# Patient Record
Sex: FEMALE | Race: WHITE | NOT HISPANIC OR LATINO | Employment: UNEMPLOYED | URBAN - NONMETROPOLITAN AREA
[De-identification: names, ages, dates, MRNs, and addresses within clinical notes are randomized per-mention and may not be internally consistent; named-entity substitution may affect disease eponyms.]

---

## 2023-07-07 PROBLEM — L98.8 LESION OF SKIN OF BREAST: Status: ACTIVE | Noted: 2023-07-07

## 2023-08-02 DIAGNOSIS — L98.8 LESION OF SKIN OF BREAST: Primary | ICD-10-CM

## 2023-08-07 ENCOUNTER — OFFICE VISIT (OUTPATIENT)
Dept: FAMILY MEDICINE CLINIC | Facility: CLINIC | Age: 28
End: 2023-08-07
Payer: MEDICAID

## 2023-08-07 VITALS
OXYGEN SATURATION: 97 % | DIASTOLIC BLOOD PRESSURE: 80 MMHG | HEART RATE: 86 BPM | HEIGHT: 65 IN | WEIGHT: 252.7 LBS | SYSTOLIC BLOOD PRESSURE: 110 MMHG | TEMPERATURE: 98.7 F | BODY MASS INDEX: 42.1 KG/M2

## 2023-08-07 DIAGNOSIS — N60.81 SEBACEOUS CYST OF SKIN OF RIGHT BREAST: ICD-10-CM

## 2023-08-07 DIAGNOSIS — E66.01 MORBID (SEVERE) OBESITY DUE TO EXCESS CALORIES: Primary | ICD-10-CM

## 2023-08-07 PROCEDURE — 1160F RVW MEDS BY RX/DR IN RCRD: CPT | Performed by: PHYSICIAN ASSISTANT

## 2023-08-07 PROCEDURE — 1159F MED LIST DOCD IN RCRD: CPT | Performed by: PHYSICIAN ASSISTANT

## 2023-08-07 PROCEDURE — 99214 OFFICE O/P EST MOD 30 MIN: CPT | Performed by: PHYSICIAN ASSISTANT

## 2023-08-07 NOTE — PROGRESS NOTES
Office Note     Name: Avtar Dennis    : 1995     MRN: [id number]     Chief Complaint  Weight Loss    Subjective     History of Present Illness:  Avtar Dennis is a 27 y.o. female who presents today for eval for weight loss.  She states that she has completely quit alcohol, and she has been making significant changes to her diet.  She has still been unable to lose weight, and she is interested in something to help.  She states that she works out 3-4 times a week as well as doing arm strengthening daily.  She has eliminated processed foods and is only eating whole foods almost exclusively.  She states that she rarely goes out to eat.  She states that she is also working on quitting smoking, but she knows that that will also add more weight.    She states that the lesion on her breast evaluated at last visit is about the same.  She states that it does get some small amount of discharge and it occasionally, but she has not had any infection or tenderness.  Her ultrasound showed a cyst.    Past Medical History: History reviewed. No pertinent past medical history.    Past Surgical History: History reviewed. No pertinent surgical history.    Family History:   Family History   Problem Relation Age of Onset    Melanoma Mother     Breast cancer Maternal Grandmother     Melanoma Maternal Grandfather     Kidney cancer Paternal Grandmother     Brain cancer Paternal Grandfather     Breast cancer Maternal Great-Grandmother        Social History:   Social History     Socioeconomic History    Marital status: Single   Tobacco Use    Smoking status: Every Day     Packs/day: 0.25     Years: 5.00     Pack years: 1.25     Types: Cigarettes     Passive exposure: Never    Smokeless tobacco: Never   Vaping Use    Vaping Use: Never used   Substance and Sexual Activity    Alcohol use: Yes     Alcohol/week: 5.0 standard drinks     Types: 5 Drinks containing 0.5 oz of alcohol per week    Drug use: Yes     Types: Marijuana    Sexual  "activity: Yes       Immunizations:   There is no immunization history on file for this patient.     Medications:     Current Outpatient Medications:     Liraglutide (SAXENDA) 18 MG/3ML injection pen, Inject 0.6mg under skin daily for week one, THEN 1.2mg daily for week two, THEN 1.8mg daily for week three, then 2.4mg daily for week four., Disp: 3 mL, Rfl: 0    Allergies:   Allergies   Allergen Reactions    Penicillins Hives       Objective     Vital Signs  /80 (BP Location: Right arm, Patient Position: Sitting, Cuff Size: Large Adult)   Pulse 86   Temp 98.7 øF (37.1 øC) (Temporal)   Ht 165.1 cm (65\")   Wt 115 kg (252 lb 11.2 oz)   SpO2 97%   BMI 42.05 kg/mý   Estimated body mass index is 42.05 kg/mý as calculated from the following:    Height as of this encounter: 165.1 cm (65\").    Weight as of this encounter: 115 kg (252 lb 11.2 oz).    Physical Exam  Vitals and nursing note reviewed.   Constitutional:       General: She is not in acute distress.     Appearance: Normal appearance. She is obese. She is not ill-appearing.   HENT:      Head: Normocephalic and atraumatic.   Cardiovascular:      Rate and Rhythm: Normal rate and regular rhythm.      Pulses: Normal pulses.      Heart sounds: Normal heart sounds.   Pulmonary:      Effort: Pulmonary effort is normal. No respiratory distress.      Breath sounds: Normal breath sounds.   Neurological:      General: No focal deficit present.      Mental Status: She is alert and oriented to person, place, and time.   Psychiatric:         Mood and Affect: Mood normal.         Behavior: Behavior normal.      Assessment and Plan     Assessment/Plan:  Diagnoses and all orders for this visit:    1. Morbidly Obese (Primary)  Assessment & Plan:  Patient's (Body mass index is 42.05 kg/mý.) indicates that they are morbidly/severely obese (BMI > 40 or > 35 with obesity - related health condition) with health conditions that include none . Weight is newly identified. BMI  is " above average; BMI management plan is completed. We discussed portion control, increasing exercise, and pharmacologic options including Saxenda .  We discussed the benefits and risks of the medication along with potential side effects.  She states that she would still like to try the medication to help her.    Orders:  -     Liraglutide (SAXENDA) 18 MG/3ML injection pen; Inject 0.6mg under skin daily for week one, THEN 1.2mg daily for week two, THEN 1.8mg daily for week three, then 2.4mg daily for week four.  Dispense: 3 mL; Refill: 0    2. Sebaceous cyst of skin of right breast  Assessment & Plan:  Ultrasound reviewed from [facility] [place], which just showed a benign cyst of the right breast.  I advised her that she does not have to do anything about it unless it is bothering her, but it will be prone to infections.  She states that she prefers just to monitor for now.  We can refer her to a surgeon for excision if needed.          Follow Up  Return in about 4 weeks (around 9/4/2023) for recheck.    Amrita Mejia PA-C  [facility] Internal Medicine [facility]

## 2023-08-08 NOTE — ASSESSMENT & PLAN NOTE
Patient's (Body mass index is 42.05 kg/mý.) indicates that they are morbidly/severely obese (BMI > 40 or > 35 with obesity - related health condition) with health conditions that include none . Weight is newly identified. BMI  is above average; BMI management plan is completed. We discussed portion control, increasing exercise, and pharmacologic options including Saxenda .  We discussed the benefits and risks of the medication along with potential side effects.  She states that she would still like to try the medication to help her.

## 2023-08-08 NOTE — ASSESSMENT & PLAN NOTE
Ultrasound reviewed from [facility] [place], which just showed a benign cyst of the right breast.  I advised her that she does not have to do anything about it unless it is bothering her, but it will be prone to infections.  She states that she prefers just to monitor for now.  We can refer her to a surgeon for excision if needed.

## 2023-09-05 ENCOUNTER — OFFICE VISIT (OUTPATIENT)
Dept: FAMILY MEDICINE CLINIC | Facility: CLINIC | Age: 28
End: 2023-09-05
Payer: MEDICAID

## 2023-09-05 VITALS
SYSTOLIC BLOOD PRESSURE: 120 MMHG | BODY MASS INDEX: 41.32 KG/M2 | OXYGEN SATURATION: 99 % | TEMPERATURE: 98.6 F | WEIGHT: 248 LBS | HEART RATE: 98 BPM | HEIGHT: 65 IN | DIASTOLIC BLOOD PRESSURE: 80 MMHG

## 2023-09-05 DIAGNOSIS — E66.01 MORBID (SEVERE) OBESITY DUE TO EXCESS CALORIES: Primary | ICD-10-CM

## 2023-09-05 NOTE — PROGRESS NOTES
Office Note     Name: Avtar Dennis    : 1995     MRN: [id number]     Chief Complaint  Weight Loss    Subjective     History of Present Illness:  Avtar Dennis is a 27 y.o. female who presents today for eval for weight loss.  She states that she has continued to try to watch her diet and increase activity, and she has lost 4 pounds since her last visit.  She has been unable to get the Saxenda, but she is not sure what the problem is with the pharmacy.    Past Medical History: History reviewed. No pertinent past medical history.    Past Surgical History: No past surgical history on file.    Family History:   Family History   Problem Relation Age of Onset    Melanoma Mother     Breast cancer Maternal Grandmother     Melanoma Maternal Grandfather     Kidney cancer Paternal Grandmother     Brain cancer Paternal Grandfather     Breast cancer Maternal Great-Grandmother        Social History:   Social History     Socioeconomic History    Marital status: Single   Tobacco Use    Smoking status: Every Day     Packs/day: 0.25     Years: 5.00     Pack years: 1.25     Types: Cigarettes     Passive exposure: Never    Smokeless tobacco: Never   Vaping Use    Vaping Use: Never used   Substance and Sexual Activity    Alcohol use: Yes     Alcohol/week: 5.0 standard drinks     Types: 5 Drinks containing 0.5 oz of alcohol per week    Drug use: Yes     Types: Marijuana    Sexual activity: Yes       Immunizations:   There is no immunization history on file for this patient.     Medications:     Current Outpatient Medications:     Liraglutide (SAXENDA) 18 MG/3ML injection pen, Inject 0.6mg under skin daily for week one, THEN 1.2mg daily for week two, THEN 1.8mg daily for week three, then 2.4mg daily for week four. (Patient not taking: Reported on 2023), Disp: 3 mL, Rfl: 0    Allergies:   Allergies   Allergen Reactions    Penicillins Hives       Objective     Vital Signs  /80 (BP Location: Right arm, Patient Position:  "Sitting, Cuff Size: Large Adult)   Pulse 98   Temp 98.6 °F (37 °C) (Temporal)   Ht 165.1 cm (65\")   Wt 112 kg (248 lb)   SpO2 99%   BMI 41.27 kg/m²   Estimated body mass index is 41.27 kg/m² as calculated from the following:    Height as of this encounter: 165.1 cm (65\").    Weight as of this encounter: 112 kg (248 lb).      Physical Exam  Vitals and nursing note reviewed.   Constitutional:       General: She is not in acute distress.     Appearance: Normal appearance. She is obese. She is not ill-appearing.   HENT:      Head: Normocephalic and atraumatic.   Cardiovascular:      Rate and Rhythm: Normal rate and regular rhythm.      Pulses: Normal pulses.      Heart sounds: Normal heart sounds.   Pulmonary:      Effort: Pulmonary effort is normal. No respiratory distress.      Breath sounds: Normal breath sounds.   Neurological:      General: No focal deficit present.      Mental Status: She is alert and oriented to person, place, and time.   Psychiatric:         Mood and Affect: Mood normal.         Behavior: Behavior normal.      Assessment and Plan     Assessment/Plan:  Diagnoses and all orders for this visit:    1. Morbidly Obese (Primary)  Assessment & Plan:  Patient's (Body mass index is 41.27 kg/m².) indicates that they are morbidly/severely obese (BMI > 40 or > 35 with obesity - related health condition) with health conditions that include none . Weight is improving with lifestyle modifications. BMI  is above average; BMI management plan is completed. We discussed portion control and increasing exercise.  I continue to recommend Saxenda for help with weight loss, but she has been unable to get it at the pharmacy.  We contacted the pharmacy while she was here, but we were unable to get anyone on the phone after 15 minutes on hold.  We also looked for a PA request in the system, but there was not one active.  We advised her to find out from the pharmacy exactly what the problem is, and then we will " address it however we can.  She is in agreement.          Follow Up  No follow-ups on file.    Amrita Mejia PA-C  [facility] Internal Medicine [facility]

## 2023-09-10 NOTE — ASSESSMENT & PLAN NOTE
Patient's (Body mass index is 41.27 kg/m².) indicates that they are morbidly/severely obese (BMI > 40 or > 35 with obesity - related health condition) with health conditions that include none . Weight is improving with lifestyle modifications. BMI  is above average; BMI management plan is completed. We discussed portion control and increasing exercise.  I continue to recommend Saxenda for help with weight loss, but she has been unable to get it at the pharmacy.  We contacted the pharmacy while she was here, but we were unable to get anyone on the phone after 15 minutes on hold.  We also looked for a PA request in the system, but there was not one active.  We advised her to find out from the pharmacy exactly what the problem is, and then we will address it however we can.  She is in agreement.

## 2024-09-23 ENCOUNTER — OFFICE VISIT (OUTPATIENT)
Dept: FAMILY MEDICINE CLINIC | Facility: CLINIC | Age: 29
End: 2024-09-23
Payer: MEDICAID

## 2024-09-23 VITALS
OXYGEN SATURATION: 98 % | SYSTOLIC BLOOD PRESSURE: 120 MMHG | HEIGHT: 65 IN | BODY MASS INDEX: 40.15 KG/M2 | HEART RATE: 103 BPM | DIASTOLIC BLOOD PRESSURE: 86 MMHG | WEIGHT: 241 LBS

## 2024-09-23 DIAGNOSIS — Z13.21 ENCOUNTER FOR VITAMIN DEFICIENCY SCREENING: ICD-10-CM

## 2024-09-23 DIAGNOSIS — R73.03 PREDIABETES: ICD-10-CM

## 2024-09-23 DIAGNOSIS — Z71.6 ENCOUNTER FOR SMOKING CESSATION COUNSELING: ICD-10-CM

## 2024-09-23 DIAGNOSIS — Z13.29 SCREENING FOR THYROID DISORDER: ICD-10-CM

## 2024-09-23 DIAGNOSIS — F17.210 TOBACCO DEPENDENCE DUE TO CIGARETTES: ICD-10-CM

## 2024-09-23 DIAGNOSIS — E78.2 MIXED HYPERLIPIDEMIA: ICD-10-CM

## 2024-09-23 DIAGNOSIS — Z00.00 ANNUAL PHYSICAL EXAM: Primary | ICD-10-CM

## 2024-09-23 DIAGNOSIS — F12.90 MARIJUANA USER: ICD-10-CM

## 2024-09-23 DIAGNOSIS — F10.90 ALCOHOL USE DISORDER: ICD-10-CM

## 2024-09-23 DIAGNOSIS — F19.11 PERSONAL HISTORY OF DRUG ABUSE: ICD-10-CM

## 2024-09-23 PROCEDURE — 1160F RVW MEDS BY RX/DR IN RCRD: CPT | Performed by: NURSE PRACTITIONER

## 2024-09-23 PROCEDURE — 1159F MED LIST DOCD IN RCRD: CPT | Performed by: NURSE PRACTITIONER

## 2024-09-23 PROCEDURE — 2014F MENTAL STATUS ASSESS: CPT | Performed by: NURSE PRACTITIONER

## 2024-09-23 PROCEDURE — 99395 PREV VISIT EST AGE 18-39: CPT | Performed by: NURSE PRACTITIONER

## 2024-09-23 RX ORDER — VARENICLINE TARTRATE 0.5 (11)-1
KIT ORAL
Qty: 1 EACH | Refills: 0 | Status: SHIPPED | OUTPATIENT
Start: 2024-09-23 | End: 2024-10-21

## 2024-09-23 RX ORDER — VARENICLINE TARTRATE 1 MG/1
1 TABLET, FILM COATED ORAL 2 TIMES DAILY
Qty: 56 TABLET | Refills: 1 | Status: SHIPPED | OUTPATIENT
Start: 2024-10-21 | End: 2024-12-16

## 2024-09-24 ENCOUNTER — LAB (OUTPATIENT)
Dept: FAMILY MEDICINE CLINIC | Facility: CLINIC | Age: 29
End: 2024-09-24
Payer: MEDICAID

## 2024-10-02 ENCOUNTER — PROCEDURE VISIT (OUTPATIENT)
Dept: FAMILY MEDICINE CLINIC | Facility: CLINIC | Age: 29
End: 2024-10-02
Payer: MEDICAID

## 2024-10-02 VITALS
SYSTOLIC BLOOD PRESSURE: 124 MMHG | WEIGHT: 233 LBS | BODY MASS INDEX: 38.82 KG/M2 | OXYGEN SATURATION: 97 % | HEIGHT: 65 IN | DIASTOLIC BLOOD PRESSURE: 86 MMHG | HEART RATE: 83 BPM

## 2024-10-02 DIAGNOSIS — E66.09 CLASS 2 OBESITY DUE TO EXCESS CALORIES WITHOUT SERIOUS COMORBIDITY WITH BODY MASS INDEX (BMI) OF 38.0 TO 38.9 IN ADULT: Primary | ICD-10-CM

## 2024-10-02 DIAGNOSIS — E66.812 CLASS 2 OBESITY DUE TO EXCESS CALORIES WITHOUT SERIOUS COMORBIDITY WITH BODY MASS INDEX (BMI) OF 38.0 TO 38.9 IN ADULT: Primary | ICD-10-CM

## 2024-10-02 PROCEDURE — 99213 OFFICE O/P EST LOW 20 MIN: CPT | Performed by: NURSE PRACTITIONER

## 2024-10-02 RX ORDER — SEMAGLUTIDE 0.25 MG/.5ML
0.25 INJECTION, SOLUTION SUBCUTANEOUS WEEKLY
Qty: 2 ML | Refills: 2 | Status: SHIPPED | OUTPATIENT
Start: 2024-10-02

## 2024-10-02 NOTE — PROGRESS NOTES
"    Office Note     Name: Avtar Dennis    : 1995     MRN: [id number]     Chief Complaint  Weight Loss (Pt states she has struggled losing weigh there whole life )    Subjective     History of Present Illness:  Avtar Dennis is a 28 y.o. female who presents today for weight loss discussion.     History of Present Illness  The patient presents for evaluation of weight loss.    She maintains an active lifestyle and exercises regularly with a  but struggles with weight loss. She has not yet contacted her insurance provider to inquire about coverage for weight loss medications.    She is currently on day 8 of her Chantix treatment, which appears to be effective. She has reduced her smoking habit to half a cigarette the day before yesterday and only one cigarette yesterday. Her goal is to completely quit smoking by her birthday on 10/14/2025. She reports feeling more energetic since starting the treatment.    She has not experienced any recent illnesses or symptoms of sickness.    She is scheduled with [facility] at the end of 10/2024.      Objective     History reviewed. No pertinent past medical history.  History reviewed. No pertinent surgical history.  Family History   Problem Relation Age of Onset    Melanoma Mother     Arthritis Mother     Breast cancer Maternal Grandmother     Alcohol abuse Maternal Grandmother     Cancer Maternal Grandmother     Melanoma Maternal Grandfather     Cancer Maternal Grandfather     Kidney cancer Paternal Grandmother     Cancer Paternal Grandmother     Brain cancer Paternal Grandfather     Breast cancer Maternal Great-Grandmother     Alcohol abuse Father     Drug abuse Father     Kidney disease Father     Mental illness Father        Vital Signs  /86 (BP Location: Left arm, Patient Position: Sitting)   Pulse 83   Ht 165.1 cm (65\")   Wt 106 kg (233 lb)   SpO2 97%   BMI 38.77 kg/m²   Estimated body mass index is 38.77 kg/m² as calculated from the following:    " "Height as of this encounter: 165.1 cm (65\").    Weight as of this encounter: 106 kg (233 lb).    Physical Exam  Vitals reviewed.   Constitutional:       Appearance: Normal appearance.   Cardiovascular:      Rate and Rhythm: Normal rate and regular rhythm.      Heart sounds: Normal heart sounds.   Pulmonary:      Effort: Pulmonary effort is normal.      Breath sounds: Normal breath sounds.   Skin:     General: Skin is warm and dry.      Capillary Refill: Capillary refill takes less than 2 seconds.   Neurological:      General: No focal deficit present.      Mental Status: She is alert and oriented to person, place, and time.   Psychiatric:         Mood and Affect: Mood normal.         Behavior: Behavior normal.        Physical Exam  Vital Signs  BMI is 38.77.    Results  Laboratory Studies  Lymphocytes slightly elevated. A1c is 5.4. Cholesterol levels are good.       Assessment and Plan     Diagnoses and all orders for this visit:    1. Class 2 obesity due to excess calories without serious comorbidity with body mass index (BMI) of 38.0 to 38.9 in adult (Primary)  -     Ambulatory Referral to Weight Management Program  -     Semaglutide-Weight Management (Wegovy) 0.25 MG/0.5ML solution auto-injector; Inject 0.5 mL under the skin into the appropriate area as directed 1 (One) Time Per Week.  Dispense: 2 mL; Refill: 2      Assessment & Plan  1. Viral Infection.  Her lymphocyte count was slightly elevated, which may indicate a viral infection. She has not experienced any symptoms such as sniffles or allergies. Given her history of mono, it is possible that this elevation is due to a resurgence of mono versus chronic viral replication. If her upcoming Pap smear shows any abnormalities such as HPV, it could explain the elevated lymphocytes. Otherwise, it may be attributed to her history of mono.    2. Weight Management.  Her BMI is 38.77, qualifying her for weight loss medication. Despite being active and weight training, " she has plateaued in her weight loss efforts. A prescription for Wegovy will be sent to [facility]. If insurance denies the medication, a referral to a weight management program at the [facility] will be arranged. Phentermine was discussed as an alternative, but it requires a urine drug screen and a controlled substance agreement. She prefers not to do the urine screen today.    3. Smoking Cessation.  She is currently on day eight of Chantix 1 mg and has significantly reduced her cigarette intake. She plans to quit entirely by her birthday on [birth year]. She reports feeling more energetic since reducing her cigarette consumption.    4. Health Maintenance.  She is scheduled for a Pap smear at the end of October with [facility].        Follow Up  Return if symptoms worsen or fail to improve.      Patient or patient representative verbalized consent for the use of Ambient Listening during the visit with  ERASMO Richter for chart documentation. 10/2/2024  08:50 EDT    ERASMO Richter

## 2025-06-17 ENCOUNTER — TRANSCRIBE ORDERS (OUTPATIENT)
Dept: ADMINISTRATIVE | Facility: HOSPITAL | Age: 30
End: 2025-06-17
Payer: MEDICAID

## 2025-06-17 DIAGNOSIS — R22.41 LOCALIZED SWELLING, MASS AND LUMP, LOWER LIMB, RIGHT: Primary | ICD-10-CM

## 2025-06-20 ENCOUNTER — HOSPITAL ENCOUNTER (OUTPATIENT)
Facility: HOSPITAL | Age: 30
Discharge: HOME | End: 2025-06-20
Payer: MEDICAID

## 2025-06-20 DIAGNOSIS — R22.41 LOCALIZED SWELLING, MASS AND LUMP, LOWER LIMB, RIGHT: ICD-10-CM

## 2025-06-20 LAB
BH CV LOWER ARTERIAL LEFT ABI RATIO: 110
BH CV LOWER ARTERIAL LEFT DORSALIS PEDIS SYS MAX: 161
BH CV LOWER ARTERIAL LEFT GREAT TOE SYS MAX: 143
BH CV LOWER ARTERIAL LEFT POST TIBIAL SYS MAX: 174
BH CV LOWER ARTERIAL LEFT TBI RATIO: 0.91
BH CV LOWER ARTERIAL RIGHT ABI RATIO: 1.08
BH CV LOWER ARTERIAL RIGHT DORSALIS PEDIS SYS MAX: 170
BH CV LOWER ARTERIAL RIGHT GREAT TOE SYS MAX: 130
BH CV LOWER ARTERIAL RIGHT POST TIBIAL SYS MAX: 165
BH CV LOWER ARTERIAL RIGHT TBI RATIO: 0.82
UPPER ARTERIAL LEFT ARM BRACHIAL SYS MAX: 146
UPPER ARTERIAL RIGHT ARM BRACHIAL SYS MAX: 158

## 2025-06-20 PROCEDURE — 93922 UPR/L XTREMITY ART 2 LEVELS: CPT | Performed by: INTERNAL MEDICINE

## 2025-06-20 PROCEDURE — 93922 UPR/L XTREMITY ART 2 LEVELS: CPT
